# Patient Record
Sex: MALE | Race: WHITE | Employment: FULL TIME | URBAN - METROPOLITAN AREA
[De-identification: names, ages, dates, MRNs, and addresses within clinical notes are randomized per-mention and may not be internally consistent; named-entity substitution may affect disease eponyms.]

---

## 2023-06-28 ENCOUNTER — OFFICE VISIT (OUTPATIENT)
Dept: ORTHOPEDIC SURGERY | Age: 73
End: 2023-06-28
Payer: COMMERCIAL

## 2023-06-28 DIAGNOSIS — S61.311A LACERATION OF LEFT INDEX FINGER WITH DAMAGE TO NAIL, FOREIGN BODY PRESENCE UNSPECIFIED, INITIAL ENCOUNTER: Primary | ICD-10-CM

## 2023-06-28 DIAGNOSIS — S62.661A CLOSED NONDISPLACED FRACTURE OF DISTAL PHALANX OF LEFT INDEX FINGER, INITIAL ENCOUNTER: ICD-10-CM

## 2023-06-28 PROCEDURE — 99214 OFFICE O/P EST MOD 30 MIN: CPT | Performed by: NURSE PRACTITIONER

## 2023-06-28 PROCEDURE — 1123F ACP DISCUSS/DSCN MKR DOCD: CPT | Performed by: NURSE PRACTITIONER

## 2023-06-28 RX ORDER — AMLODIPINE BESYLATE 10 MG/1
10 TABLET ORAL DAILY
COMMUNITY

## 2023-07-06 ENCOUNTER — OFFICE VISIT (OUTPATIENT)
Dept: ORTHOPEDIC SURGERY | Age: 73
End: 2023-07-06
Payer: MEDICARE

## 2023-07-06 DIAGNOSIS — S61.311A LACERATION OF LEFT INDEX FINGER WITH DAMAGE TO NAIL, FOREIGN BODY PRESENCE UNSPECIFIED, INITIAL ENCOUNTER: Primary | ICD-10-CM

## 2023-07-06 DIAGNOSIS — S62.661A CLOSED NONDISPLACED FRACTURE OF DISTAL PHALANX OF LEFT INDEX FINGER, INITIAL ENCOUNTER: ICD-10-CM

## 2023-07-06 PROCEDURE — G8421 BMI NOT CALCULATED: HCPCS | Performed by: NURSE PRACTITIONER

## 2023-07-06 PROCEDURE — G8427 DOCREV CUR MEDS BY ELIG CLIN: HCPCS | Performed by: NURSE PRACTITIONER

## 2023-07-06 PROCEDURE — 3017F COLORECTAL CA SCREEN DOC REV: CPT | Performed by: NURSE PRACTITIONER

## 2023-07-06 PROCEDURE — 1036F TOBACCO NON-USER: CPT | Performed by: NURSE PRACTITIONER

## 2023-07-06 PROCEDURE — 99213 OFFICE O/P EST LOW 20 MIN: CPT | Performed by: NURSE PRACTITIONER

## 2023-07-06 PROCEDURE — 1123F ACP DISCUSS/DSCN MKR DOCD: CPT | Performed by: NURSE PRACTITIONER

## 2023-07-06 NOTE — PROGRESS NOTES
[facility] Note    Name: Amadeo López  YOB: 1950  Gender: male  MRN: [id number]      Follow up visit:   1. Laceration of left index finger with damage to nail, foreign body presence unspecified, initial encounter    2. Closed nondisplaced fracture of distal phalanx of left index finger, initial encounter        HPI: Amadeo López is a 68 y.o. male who is following up for left index finger distal phalanx fracture and laceration. Patient is 2 weeks out from injury. He and his wife come in today for his recheck. He has been very compliant with his dressings. He has 2 more days of antibiotics. He said he is tolerating those without difficulty. ROS/Meds/PSH/PMH/FH/SH: I personally reviewed the patients standard intake form. Pertinents are discussed in the HPI    Physical Examination:    Musculoskeletal Examination:  Examination on the left upper extremity demonstrates cap refill < 5 seconds in all fingers  Patient has swelling to the left index finger. There is a wound on the volar aspect of the left index finger tip. There are sutures in place. There is no erythema. There is some active oozing of blood. Patient has decreased range of motion secondary to pain. All tendons are working properly. Patient denies any paresthesia. He has good capillary refill. Imaging / Electrodiagnostic Tests:     None    Assessment:     ICD-10-CM    1. Laceration of left index finger with damage to nail, foreign body presence unspecified, initial encounter  S61.311A       2. Closed nondisplaced fracture of distal phalanx of left index finger, initial encounter  P46.880Z           Plan:   We discussed the diagnosis and different treatment options. We discussed observation, therapy, antiinflammatory medications and other pertinent treatment modalities. After discussing in detail the patient elects to proceed with continuing the course of his antibiotics.   He is instructed to call if he

## 2023-07-13 ENCOUNTER — OFFICE VISIT (OUTPATIENT)
Dept: ORTHOPEDIC SURGERY | Age: 73
End: 2023-07-13
Payer: MEDICARE

## 2023-07-13 DIAGNOSIS — S61.311A LACERATION OF LEFT INDEX FINGER WITH DAMAGE TO NAIL, FOREIGN BODY PRESENCE UNSPECIFIED, INITIAL ENCOUNTER: Primary | ICD-10-CM

## 2023-07-13 DIAGNOSIS — S62.661A CLOSED NONDISPLACED FRACTURE OF DISTAL PHALANX OF LEFT INDEX FINGER, INITIAL ENCOUNTER: ICD-10-CM

## 2023-07-13 PROCEDURE — 1036F TOBACCO NON-USER: CPT | Performed by: NURSE PRACTITIONER

## 2023-07-13 PROCEDURE — 3017F COLORECTAL CA SCREEN DOC REV: CPT | Performed by: NURSE PRACTITIONER

## 2023-07-13 PROCEDURE — 1123F ACP DISCUSS/DSCN MKR DOCD: CPT | Performed by: NURSE PRACTITIONER

## 2023-07-13 PROCEDURE — 99213 OFFICE O/P EST LOW 20 MIN: CPT | Performed by: NURSE PRACTITIONER

## 2023-07-13 PROCEDURE — G8421 BMI NOT CALCULATED: HCPCS | Performed by: NURSE PRACTITIONER

## 2023-07-13 PROCEDURE — G8427 DOCREV CUR MEDS BY ELIG CLIN: HCPCS | Performed by: NURSE PRACTITIONER

## 2023-07-13 NOTE — PROGRESS NOTES
[facility] Note    Name: Ramonita Carrel  YOB: 1950  Gender: male  MRN: [id number]      Follow up visit:   1. Laceration of left index finger with damage to nail, foreign body presence unspecified, initial encounter    2. Closed nondisplaced fracture of distal phalanx of left index finger, initial encounter        HPI: Ramonita Carrel is a 68 y.o. male who is following up for left index finger laceration and left distal phalanx fracture. He is 2 weeks and 2 days post injury. He is complete his antibiotics. He is doing dressing changes. He does report he is been soaking it in peroxide. .      ROS/Meds/PSH/PMH/FH/SH: I personally reviewed the patients standard intake form. Pertinents are discussed in the HPI    Physical Examination:    Musculoskeletal Examination:  Examination on the left upper extremity demonstrates cap refill < 5 seconds in all fingers  Patient has swelling to the left index finger. There is a wound on the volar aspect of the left index finger tip. There is no erythema. There is no drainage. Patient has good granulation tissue. No signs of infection. Patient has decreased range of motion secondary to pain. He does have some maceration to surrounding skin. All tendons are working properly. Patient denies any paresthesia. He has good capillary refill. Imaging / Electrodiagnostic Tests:     None    Assessment:     ICD-10-CM    1. Laceration of left index finger with damage to nail, foreign body presence unspecified, initial encounter  S61.311A       2. Closed nondisplaced fracture of distal phalanx of left index finger, initial encounter  Q87.966Y           Plan:   We discussed the diagnosis and different treatment options. We discussed observation, therapy, antiinflammatory medications and other pertinent treatment modalities. After discussing in detail the patient elects to proceed with continuing with dressing changes.   I have advised him not to use

## 2023-07-24 ENCOUNTER — OFFICE VISIT (OUTPATIENT)
Dept: ORTHOPEDIC SURGERY | Age: 73
End: 2023-07-24
Payer: MEDICARE

## 2023-07-24 DIAGNOSIS — S62.661A CLOSED NONDISPLACED FRACTURE OF DISTAL PHALANX OF LEFT INDEX FINGER, INITIAL ENCOUNTER: ICD-10-CM

## 2023-07-24 DIAGNOSIS — S61.311A LACERATION OF LEFT INDEX FINGER WITH DAMAGE TO NAIL, FOREIGN BODY PRESENCE UNSPECIFIED, INITIAL ENCOUNTER: Primary | ICD-10-CM

## 2023-07-24 PROCEDURE — 1036F TOBACCO NON-USER: CPT | Performed by: NURSE PRACTITIONER

## 2023-07-24 PROCEDURE — 1123F ACP DISCUSS/DSCN MKR DOCD: CPT | Performed by: NURSE PRACTITIONER

## 2023-07-24 PROCEDURE — 99213 OFFICE O/P EST LOW 20 MIN: CPT | Performed by: NURSE PRACTITIONER

## 2023-07-24 PROCEDURE — G8427 DOCREV CUR MEDS BY ELIG CLIN: HCPCS | Performed by: NURSE PRACTITIONER

## 2023-07-24 PROCEDURE — 3017F COLORECTAL CA SCREEN DOC REV: CPT | Performed by: NURSE PRACTITIONER

## 2023-07-24 PROCEDURE — G8421 BMI NOT CALCULATED: HCPCS | Performed by: NURSE PRACTITIONER

## 2023-07-24 NOTE — PROGRESS NOTES
[facility] Note    Name: Flora Shepherd  YOB: 1950  Gender: male  MRN: [id number]      Follow up visit:   1. Laceration of left index finger with damage to nail, foreign body presence unspecified, initial encounter    2. Closed nondisplaced fracture of distal phalanx of left index finger, initial encounter        HPI: Flora Shepherd is a 68 y.o. male who is following up for left index finger laceration and distal phalanx fracture. He is coming by his wife. He is 4 weeks out from injury. He notes doing much better. He is continue to soak finger in peroxide. He leave it open to air while at home. He denies pain or discomfort. ROS/Meds/PSH/PMH/FH/SH: I personally reviewed the patients standard intake form. Pertinents are discussed in the HPI    Physical Examination:    Musculoskeletal Examination:  Examination on the left upper extremity demonstrates cap refill < 5 seconds in all fingers. Patient has swelling to the left index finger. There is a wound on the volar aspect of the left index finger tip. There is no erythema. There is no drainage. Patient has good granulation tissue. No signs of infection. Patient has decreased range of motion secondary to pain. All tendons are working properly. Patient denies any paresthesia. He has good capillary refill. Imaging / Electrodiagnostic Tests:     None    Assessment:     ICD-10-CM    1. Laceration of left index finger with damage to nail, foreign body presence unspecified, initial encounter  S61.311A       2. Closed nondisplaced fracture of distal phalanx of left index finger, initial encounter  W81.251I           Plan:   We discussed the diagnosis and different treatment options. We discussed observation, therapy, antiinflammatory medications and other pertinent treatment modalities. After discussing in detail the patient elects to proceed with continuing the current treatment plan.   I will reassess his progress back